# Patient Record
Sex: MALE | Race: WHITE | Employment: OTHER | URBAN - METROPOLITAN AREA
[De-identification: names, ages, dates, MRNs, and addresses within clinical notes are randomized per-mention and may not be internally consistent; named-entity substitution may affect disease eponyms.]

---

## 2020-11-24 ENCOUNTER — OFFICE VISIT (OUTPATIENT)
Dept: OPTOMETRY | Facility: CLINIC | Age: 75
End: 2020-11-24
Payer: COMMERCIAL

## 2020-11-24 DIAGNOSIS — H25.13 NUCLEAR SCLEROTIC CATARACT OF BOTH EYES: ICD-10-CM

## 2020-11-24 DIAGNOSIS — H52.4 PRESBYOPIA: ICD-10-CM

## 2020-11-24 DIAGNOSIS — H52.223 REGULAR ASTIGMATISM OF BOTH EYES: Primary | ICD-10-CM

## 2020-11-24 DIAGNOSIS — H04.123 DRY EYES, BILATERAL: ICD-10-CM

## 2020-11-24 DIAGNOSIS — Z83.518 FAMILY HISTORY OF MACULAR DEGENERATION: ICD-10-CM

## 2020-11-24 PROCEDURE — 92004 COMPRE OPH EXAM NEW PT 1/>: CPT | Performed by: OPTOMETRIST

## 2020-11-24 PROCEDURE — 92015 DETERMINE REFRACTIVE STATE: CPT | Performed by: OPTOMETRIST

## 2020-11-24 RX ORDER — LISINOPRIL AND HYDROCHLOROTHIAZIDE 12.5; 2 MG/1; MG/1
1 TABLET ORAL
COMMUNITY

## 2020-11-24 RX ORDER — OMEGA-3 FATTY ACIDS/FISH OIL 300-1000MG
200 CAPSULE ORAL EVERY 4 HOURS PRN
COMMUNITY

## 2020-11-24 RX ORDER — ALLOPURINOL 100 MG/1
100 TABLET ORAL DAILY
COMMUNITY

## 2020-11-24 ASSESSMENT — VISUAL ACUITY
OD_SC: 20/30
OD_SC+: -1
METHOD: SNELLEN - LINEAR
CORRECTION_TYPE: GLASSES
OS_CC: 20/30-1
OS_SC+: -3
OS_SC: 20/25
OD_CC: 20/30

## 2020-11-24 ASSESSMENT — KERATOMETRY
OD_K2POWER_DIOPTERS: 42.50
OD_AXISANGLE2_DEGREES: 168
OS_AXISANGLE2_DEGREES: 141
OS_K2POWER_DIOPTERS: 42.25
OS_K1POWER_DIOPTERS: 42.75
OD_K1POWER_DIOPTERS: 42.00

## 2020-11-24 ASSESSMENT — CUP TO DISC RATIO
OD_RATIO: 0.2
OS_RATIO: 0.2

## 2020-11-24 ASSESSMENT — EXTERNAL EXAM - RIGHT EYE: OD_EXAM: NORMAL

## 2020-11-24 ASSESSMENT — REFRACTION_MANIFEST
OD_AXIS: 057
OD_ADD: +2.00
OD_AXIS: 061
OS_AXIS: 167
OS_CYLINDER: +0.50
OS_AXIS: 155
OS_CYLINDER: +0.25
OS_SPHERE: +0.50
OD_CYLINDER: +0.50
OD_CYLINDER: +0.50
OD_SPHERE: +0.25
OS_SPHERE: +0.50
OS_ADD: +2.00
OD_SPHERE: +0.25

## 2020-11-24 ASSESSMENT — TONOMETRY
IOP_METHOD: APPLANATION
OS_IOP_MMHG: 20
OD_IOP_MMHG: 20

## 2020-11-24 ASSESSMENT — CONF VISUAL FIELD
METHOD: COUNTING FINGERS
OS_NORMAL: 1
OD_NORMAL: 1

## 2020-11-24 ASSESSMENT — EXTERNAL EXAM - LEFT EYE: OS_EXAM: NORMAL

## 2020-11-24 ASSESSMENT — SLIT LAMP EXAM - LIDS
COMMENTS: NORMAL
COMMENTS: NORMAL

## 2020-11-24 ASSESSMENT — REFRACTION_WEARINGRX
OS_SPHERE: +2.50
SPECS_TYPE: OTC'S
OD_SPHERE: +2.50

## 2020-11-24 NOTE — LETTER
11/24/2020         RE: Stephen Aguirre  [id number] [facility] [id number]        Dear Colleague,    Thank you for referring your patient, Stephen Aguirre, to the [facility]. Please see a copy of my visit note below.    Chief Complaint   Patient presents with     Annual Eye Exam     New to Eye Dept       Needs prescription for computer glasses for 25 inches, with AR coating to prevent reflections when using  Zoom for meetings      [facility] [place]  Dilated Previously: Yes    What are you currently using to see?  glasses and readers       Distance Vision Acuity: Noticed gradual change in both eyes, eyes not as strong as they were. Needs good light     Near Vision Acuity: Satisfied with vision while reading and using computer with readers and with glasses    Eye Comfort: dry  Do you use eye drops? 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       OBJECTIVE: See Ophthalmology exam    ASSESSMENT:    ICD-10-CM    1. Regular astigmatism of both eyes  H52.223 EYE EXAM (SIMPLE-NONBILLABLE)     REFRACTION   2. Presbyopia  H52.4 EYE EXAM (SIMPLE-NONBILLABLE)     REFRACTION   3. Dry eyes, bilateral  H04.123 EYE EXAM (SIMPLE-NONBILLABLE)     REFRACTION     White Petrolatum-Mineral Oil (EYE LUBRICANT) OINT   4. Nuclear sclerotic cataract of both eyes  H25.13 EYE EXAM (SIMPLE-NONBILLABLE)     REFRACTION   5. Family history of wet macular degeneration, and glaucoma - mother  Z83.518 EYE EXAM (SIMPLE-NONBILLABLE)     REFRACTION      PLAN:     Patient Instructions   Fill glasses prescription for computer glasses  Use Thera Tears Nighttime Dry eye Therapy Lubricant Eye Gel- Preservative free, Refresh Liquigel or Systane Gel drops  before sleep  Return in 1 year  for eye exam      Macular degeneration prevention  Healthy things to include in your diet: spinach, colorful fruits and vegetables (especially yellows, oranges, and dark greens)  variety of nuts like walnuts, hazelnuts, almonds    cold water fish like salmon, tuna, sardines       Lluvia Souza, OD  [phone]                       Again, thank you for allowing me to participate in the care of your patient.        Sincerely,        Lluvia Souza, OD

## 2020-11-24 NOTE — PROGRESS NOTES
Chief Complaint   Patient presents with     Annual Eye Exam     New to Eye Dept       Needs prescription for computer glasses for 25 inches, with AR coating to prevent reflections when using  Zoom for meetings      [facility]: [facility] [place]  Dilated Previously: Yes    What are you currently using to see?  glasses and readers       Distance Vision Acuity: Noticed gradual change in both eyes, eyes not as strong as they were. Needs good light     Near Vision Acuity: Satisfied with vision while reading and using computer with readers and with glasses    Eye Comfort: dry  Do you use eye drops? 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       OBJECTIVE: See Ophthalmology exam    ASSESSMENT:    ICD-10-CM    1. Regular astigmatism of both eyes  H52.223 EYE EXAM (SIMPLE-NONBILLABLE)     REFRACTION   2. Presbyopia  H52.4 EYE EXAM (SIMPLE-NONBILLABLE)     REFRACTION   3. Dry eyes, bilateral  H04.123 EYE EXAM (SIMPLE-NONBILLABLE)     REFRACTION     White Petrolatum-Mineral Oil (EYE LUBRICANT) OINT   4. Nuclear sclerotic cataract of both eyes  H25.13 EYE EXAM (SIMPLE-NONBILLABLE)     REFRACTION   5. Family history of wet macular degeneration, and glaucoma - mother  Z83.518 EYE EXAM (SIMPLE-NONBILLABLE)     REFRACTION      PLAN:     Patient Instructions   Fill glasses prescription for computer glasses  Use Thera Tears Nighttime Dry eye Therapy Lubricant Eye Gel- Preservative free, Refresh Liquigel or Systane Gel drops  before sleep  Return in 1 year for eye exam      Macular degeneration prevention  Healthy things to include in your diet: spinach, colorful fruits and vegetables (especially yellows, oranges, and dark greens)  variety of nuts like walnuts, hazelnuts, almonds    cold water fish  like salmon, tuna, sardines       Lluvia Souza, OD  [phone]

## 2020-11-24 NOTE — PATIENT INSTRUCTIONS
Fill glasses prescription for computer glasses  Use Thera Tears Nighttime Dry eye Therapy Lubricant Eye Gel- Preservative free, Refresh Liquigel or Systane Gel drops  before sleep  Return in 1 year for eye exam      Macular degeneration prevention  Healthy things to include in your diet: spinach, colorful fruits and vegetables (especially yellows, oranges, and dark greens)  variety of nuts like walnuts, hazelnuts, almonds    cold water fish like salmon, tuna, sardines       Lluvia Souza, OD  [phone]

## 2020-11-27 RX ORDER — ETHYL ALCOHOL 700 MG/ML
1 GEL TOPICAL AT BEDTIME
Qty: 1 TUBE | Refills: 11 | Status: SHIPPED | OUTPATIENT
Start: 2020-11-27 | End: 2021-11-27